# Patient Record
Sex: FEMALE | Race: WHITE | Employment: FULL TIME | URBAN - METROPOLITAN AREA
[De-identification: names, ages, dates, MRNs, and addresses within clinical notes are randomized per-mention and may not be internally consistent; named-entity substitution may affect disease eponyms.]

---

## 2018-08-31 ENCOUNTER — HOSPITAL ENCOUNTER (OUTPATIENT)
Dept: PREADMISSION TESTING | Age: 33
Discharge: HOME | End: 2018-08-31
Payer: COMMERCIAL

## 2018-08-31 LAB
ANION GAP SERPL CALC-SCNC: 6 MMOL/L (ref 3–18)
ATRIAL RATE: 72 BPM
BASOPHILS # BLD: 0 K/UL (ref 0–0.1)
BASOPHILS NFR BLD: 0 % (ref 0–2)
BUN SERPL-MCNC: 17 MG/DL (ref 7–18)
BUN/CREAT SERPL: 19 (ref 12–20)
CALCIUM SERPL-MCNC: 9.1 MG/DL (ref 8.5–10.1)
CALCULATED P AXIS, ECG09: 43 DEGREES
CALCULATED R AXIS, ECG10: 61 DEGREES
CALCULATED T AXIS, ECG11: 56 DEGREES
CHLORIDE SERPL-SCNC: 104 MMOL/L (ref 100–108)
CO2 SERPL-SCNC: 31 MMOL/L (ref 21–32)
CREAT SERPL-MCNC: 0.88 MG/DL (ref 0.6–1.3)
DIAGNOSIS, 93000: NORMAL
DIFFERENTIAL METHOD BLD: ABNORMAL
EOSINOPHIL # BLD: 0.5 K/UL (ref 0–0.4)
EOSINOPHIL NFR BLD: 4 % (ref 0–5)
ERYTHROCYTE [DISTWIDTH] IN BLOOD BY AUTOMATED COUNT: 14 % (ref 11.6–14.5)
GLUCOSE SERPL-MCNC: 87 MG/DL (ref 74–99)
HCG SERPL QL: NEGATIVE
HCT VFR BLD AUTO: 39.2 % (ref 35–45)
HGB BLD-MCNC: 12.6 G/DL (ref 12–16)
LYMPHOCYTES # BLD: 2.5 K/UL (ref 0.9–3.6)
LYMPHOCYTES NFR BLD: 22 % (ref 21–52)
MCH RBC QN AUTO: 24.8 PG (ref 24–34)
MCHC RBC AUTO-ENTMCNC: 32.1 G/DL (ref 31–37)
MCV RBC AUTO: 77.2 FL (ref 74–97)
MONOCYTES # BLD: 0.6 K/UL (ref 0.05–1.2)
MONOCYTES NFR BLD: 5 % (ref 3–10)
NEUTS SEG # BLD: 7.5 K/UL (ref 1.8–8)
NEUTS SEG NFR BLD: 69 % (ref 40–73)
P-R INTERVAL, ECG05: 134 MS
PLATELET # BLD AUTO: 390 K/UL (ref 135–420)
PMV BLD AUTO: 9.8 FL (ref 9.2–11.8)
POTASSIUM SERPL-SCNC: 4.1 MMOL/L (ref 3.5–5.5)
Q-T INTERVAL, ECG07: 398 MS
QRS DURATION, ECG06: 92 MS
QTC CALCULATION (BEZET), ECG08: 435 MS
RBC # BLD AUTO: 5.08 M/UL (ref 4.2–5.3)
SODIUM SERPL-SCNC: 141 MMOL/L (ref 136–145)
VENTRICULAR RATE, ECG03: 72 BPM
WBC # BLD AUTO: 11 K/UL (ref 4.6–13.2)

## 2018-08-31 PROCEDURE — 93005 ELECTROCARDIOGRAM TRACING: CPT

## 2018-08-31 PROCEDURE — 36415 COLL VENOUS BLD VENIPUNCTURE: CPT | Performed by: OBSTETRICS & GYNECOLOGY

## 2018-08-31 PROCEDURE — 84703 CHORIONIC GONADOTROPIN ASSAY: CPT | Performed by: OBSTETRICS & GYNECOLOGY

## 2018-08-31 PROCEDURE — 85025 COMPLETE CBC W/AUTO DIFF WBC: CPT | Performed by: OBSTETRICS & GYNECOLOGY

## 2018-08-31 PROCEDURE — 80048 BASIC METABOLIC PNL TOTAL CA: CPT | Performed by: OBSTETRICS & GYNECOLOGY

## 2018-09-07 ENCOUNTER — ANESTHESIA EVENT (OUTPATIENT)
Dept: SURGERY | Age: 33
End: 2018-09-07
Payer: COMMERCIAL

## 2018-09-07 NOTE — H&P
[address]  [address]  Tel: [phone]  Fax: [phone]      PATIENT:  Umm Hernandez  YOB: 1985  DATE:   08/29/2018 9:45 AM   VISIT TYPE: Pre-Operative Visit        Assessment/Plan  # Detail Type Description    1. Assessment Cyst of ovary, unspecified laterality (N83.209). Patient Plan 8 cm cyst favoring Left ovary; however, laterality is unclear on sono. Endometrial bx also reveals endometrial polyp. pt scheduled for hysteroscopy, d&c, polypectomy and laparoscopic ovarian cystectomy, possible oophrectomy. all questions answered and consent signed. 2. Assessment Abnormal uterine bleeding (N93.9). Patient Plan  See #1         3. Assessment Endometrial polyp (N84.0). Patient Plan  See #1          This 28year old female presents for Pre op visit;.    History of Present Illness:  1. Pre op visit; The symptoms began on 08/29/2018 and generally lasts 1 Hour. The symptoms are reported as being mild. The symptoms occur constantly. The location is uterine. The symptoms are described as painless. Aggravating factors include ovarian cyst, endometrial polyp, endometritis. Relieving factors include none. She states the symptoms are acute and are of new onset. Patient is schedule for [address] D&C, laparoscopic cystectomy, possible [facility]oph[facility] on 9/10 at [facility] due to left ovarian cyst, chronic endometritis and endometrial polyp. she still has random pelvic pain and urine leakage. The procedure was discussed in detail including but not limited to the risk of heavy bleeding, infection, perforation, DVT, scar tissue formation, the need for additional procedures, injury to adjacent organs, and the risk of anesthesia. All questions were answered and consent was signed. Gynecologic History:  Patient is premenopausal.     OBSTETRIC HISTORY    Not currently pregnant.    Past Systemic History    Medical History (Detailed)    Surgical History/Management (Detailed)  Diagnostics History:  Status Study Ordered Completed Interpretation  Result / Report   completed Foot 3 Views 2015      result received US, PELVIC, COMPLETE 2018  See module         Pap Result, HPV Detail and Diagnostic/Treatment Performed  Date Test Procedure Pap Result HPV Detail Comments   2018  See module     2014 LAB        PROBLEM LIST:   Problem List reviewed. Problem Description Onset Date Chronic Clinical Status Notes   Benign essential hypertension 2011 Y     Hyperlipidemia 2012 Y           Medications (active prior to today)  Medication Instructions Start Date Stop Date Refilled Elsewhere   lorazepam 0.5 mg tablet take 1 Tablet by Oral route 8 times every hour PRN 2017   N   Cytotec 200 mcg tablet place 1 tablet in the vagina at 8 pm the night prior to the procedure 2018   N   CHLORTHALIDONE 50MG TABLETS TAKE 1 TABLET BY MOUTH EVERY DAY 2018 N       Allergies:  Ingredient Reaction (Severity) Medication Name Comment   AMOXICILLIN Hives     AMPICILLIN Hives     PENICILLINS Rash         Family History  (Detailed)  Relationship Family Member Name  Age at Death Condition Onset Age Cause of Death   Father    Cancer, lung  Y   Father    Hypertension  N   Father    COPD  N   Maternal grandfather    Multiple myeloma  Y   Maternal grandfather    Cancer - blood  N   Mother    Hyperlipidemia  N   Mother    Diabetes mellitus  N   Mother    Hypertension  N   Paternal grandmother    Stroke  N   Paternal grandmother    Cardiovascular disease  N     Social History:  (Detailed)  Preferred language is [place]. MARITAL STATUS/FAMILY/SOCIAL SUPPORT  Currently single. Smoking status: Never smoker. TOBACCO/VAPING EXPOSURE  No passive smoke exposure. ALCOHOL  There is no history of alcohol use. CAFFEINE  The patient does not use caffeine.           Review of Systems  System Neg/Pos Details   Constitutional Negative Chills, Fatigue, Fever and Weight loss. Respiratory Negative Dyspnea. Cardio Negative Chest pain. GI Negative Abdominal pain and Change in stool pattern.  Negative Dysuria, Hematuria and Urinary frequency. Integumentary Negative Pruritus and Rash. MS Negative Back pain. Reproductive Positive The patient is pre-menopausal.   Reproductive Negative Irregular menses and Vaginal discharge. Vital Signs     Gynecologic History  Patient is premenopausal.      Height  Time ft in cm Last Measured Height Position   10:02 AM 5.0 11.00 180.34 07/02/2014 Standing     Weight/BSA/BMI  Time lb oz kg Context BMI kg/m2 BSA m2   10:02 .00  109.316  33.61 2.34     Blood Pressure  Time BP mm/Hg Position Side Site Method Cuff Size   10:02 /68 sitting left arm manual      Measured By  Time Measured by   10:02 AM Sydney Lawson       Physical Exam  Exam Findings Details   Constitutional * Nourishment - obese. Constitutional Normal No acute distress. Well developed. Respiratory Normal Inspection - Normal. Effort - Normal.   Abdomen * Obese. Abdomen Normal Anterior palpation -  No Guarding,No rebound. No abdominal tenderness. Genitourinary * Pelvic deferred. Pap not performed   Psychiatric Normal Orientation - Oriented to time, place, person & situation. Appropriate mood and affect. Medications (Added, Continued or Stopped today):  Start Date Medication Directions PRN Status PRN Reason Instruction Stop Date   08/23/2018 CHLORTHALIDONE 50MG TABLETS TAKE 1 TABLET BY MOUTH EVERY DAY N      06/19/2018 Cytotec 200 mcg tablet place 1 tablet in the vagina at 8 pm the night prior to the procedure N      03/09/2017 lorazepam 0.5 mg tablet take 1 Tablet by Oral route 8 times every hour PRN N            The patient was checked out at 10:39 AM by Deion Shane.   Active Patient Care Team Members    Name Contact Agency Type Support Role Relationship Active Date Inactive Date Specialty   Zuleima Parra   Patient provider PCP   Family Practice     Provider:   No interval changes in the patient's history  Isrrael Rosenberg 09/06/2018 9:36 AM   Document generated by:  Sakina Tran 09/06/2018 09:35 AM

## 2018-09-10 ENCOUNTER — HOSPITAL ENCOUNTER (OUTPATIENT)
Age: 33
Setting detail: OUTPATIENT SURGERY
Discharge: HOME | End: 2018-09-10
Attending: OBSTETRICS & GYNECOLOGY | Admitting: OBSTETRICS & GYNECOLOGY
Payer: COMMERCIAL

## 2018-09-10 ENCOUNTER — ANESTHESIA (OUTPATIENT)
Dept: SURGERY | Age: 33
End: 2018-09-10
Payer: COMMERCIAL

## 2018-09-10 VITALS
SYSTOLIC BLOOD PRESSURE: 101 MMHG | DIASTOLIC BLOOD PRESSURE: 54 MMHG | RESPIRATION RATE: 16 BRPM | OXYGEN SATURATION: 100 % | HEART RATE: 67 BPM | BODY MASS INDEX: 32.03 KG/M2 | TEMPERATURE: 97 F | WEIGHT: 236.5 LBS | HEIGHT: 72 IN

## 2018-09-10 DIAGNOSIS — N83.202 CYST OF LEFT OVARY: Primary | ICD-10-CM

## 2018-09-10 PROBLEM — N83.209 OVARIAN CYST: Status: RESOLVED | Noted: 2018-09-10 | Resolved: 2018-09-10

## 2018-09-10 PROBLEM — N84.0 ENDOMETRIAL POLYP: Status: RESOLVED | Noted: 2018-09-10 | Resolved: 2018-09-10

## 2018-09-10 PROBLEM — N83.209 OVARIAN CYST: Status: ACTIVE | Noted: 2018-09-10

## 2018-09-10 PROBLEM — N84.0 ENDOMETRIAL POLYP: Status: ACTIVE | Noted: 2018-09-10

## 2018-09-10 LAB — HCG UR QL: NEGATIVE

## 2018-09-10 PROCEDURE — 77030032490 HC SLV COMPR SCD KNE COVD -B: Performed by: OBSTETRICS & GYNECOLOGY

## 2018-09-10 PROCEDURE — 77030020782 HC GWN BAIR PAWS FLX 3M -B: Performed by: OBSTETRICS & GYNECOLOGY

## 2018-09-10 PROCEDURE — 74011250636 HC RX REV CODE- 250/636: Performed by: OBSTETRICS & GYNECOLOGY

## 2018-09-10 PROCEDURE — 77030008603 HC TRCR ENDOSC EPATH J&J -C: Performed by: OBSTETRICS & GYNECOLOGY

## 2018-09-10 PROCEDURE — 77030008683 HC TU ET CUF COVD -A: Performed by: NURSE ANESTHETIST, CERTIFIED REGISTERED

## 2018-09-10 PROCEDURE — 88305 TISSUE EXAM BY PATHOLOGIST: CPT | Performed by: OBSTETRICS & GYNECOLOGY

## 2018-09-10 PROCEDURE — 81025 URINE PREGNANCY TEST: CPT

## 2018-09-10 PROCEDURE — 74011000250 HC RX REV CODE- 250

## 2018-09-10 PROCEDURE — 77030008477 HC STYL SATN SLP COVD -A: Performed by: NURSE ANESTHETIST, CERTIFIED REGISTERED

## 2018-09-10 PROCEDURE — 74011250636 HC RX REV CODE- 250/636: Performed by: SPECIALIST

## 2018-09-10 PROCEDURE — 76210000026 HC REC RM PH II 1 TO 1.5 HR: Performed by: OBSTETRICS & GYNECOLOGY

## 2018-09-10 PROCEDURE — 76010000131 HC OR TIME 2 TO 2.5 HR: Performed by: OBSTETRICS & GYNECOLOGY

## 2018-09-10 PROCEDURE — 77030019927 HC TBNG IRR CYSTO BAXT -A: Performed by: OBSTETRICS & GYNECOLOGY

## 2018-09-10 PROCEDURE — 77030020255 HC SOL INJ LR 1000ML BG: Performed by: OBSTETRICS & GYNECOLOGY

## 2018-09-10 PROCEDURE — 77030008602 HC TRCR ENDOSC EPATH J&J -B: Performed by: OBSTETRICS & GYNECOLOGY

## 2018-09-10 PROCEDURE — 77030002933 HC SUT MCRYL J&J -A: Performed by: OBSTETRICS & GYNECOLOGY

## 2018-09-10 PROCEDURE — 74011000250 HC RX REV CODE- 250: Performed by: OBSTETRICS & GYNECOLOGY

## 2018-09-10 PROCEDURE — 77030039266 HC ADH SKN EXOFIN S2SG -A: Performed by: OBSTETRICS & GYNECOLOGY

## 2018-09-10 PROCEDURE — 74011250636 HC RX REV CODE- 250/636

## 2018-09-10 PROCEDURE — 77030013079 HC BLNKT BAIR HGGR 3M -A: Performed by: NURSE ANESTHETIST, CERTIFIED REGISTERED

## 2018-09-10 PROCEDURE — 76210000006 HC OR PH I REC 0.5 TO 1 HR: Performed by: OBSTETRICS & GYNECOLOGY

## 2018-09-10 PROCEDURE — 77030034849: Performed by: OBSTETRICS & GYNECOLOGY

## 2018-09-10 PROCEDURE — 76060000035 HC ANESTHESIA 2 TO 2.5 HR: Performed by: OBSTETRICS & GYNECOLOGY

## 2018-09-10 PROCEDURE — 74011250637 HC RX REV CODE- 250/637: Performed by: OBSTETRICS & GYNECOLOGY

## 2018-09-10 RX ORDER — MORPHINE SULFATE 4 MG/ML
1 INJECTION INTRAVENOUS
Status: DISCONTINUED | OUTPATIENT
Start: 2018-09-10 | End: 2018-09-10 | Stop reason: HOSPADM

## 2018-09-10 RX ORDER — GLYCOPYRROLATE 0.2 MG/ML
INJECTION INTRAMUSCULAR; INTRAVENOUS AS NEEDED
Status: DISCONTINUED | OUTPATIENT
Start: 2018-09-10 | End: 2018-09-10 | Stop reason: HOSPADM

## 2018-09-10 RX ORDER — SODIUM CHLORIDE 9 MG/ML
125 INJECTION, SOLUTION INTRAVENOUS CONTINUOUS
Status: DISCONTINUED | OUTPATIENT
Start: 2018-09-10 | End: 2018-09-10 | Stop reason: HOSPADM

## 2018-09-10 RX ORDER — HYDROMORPHONE HYDROCHLORIDE 2 MG/ML
0.2 INJECTION, SOLUTION INTRAMUSCULAR; INTRAVENOUS; SUBCUTANEOUS
Status: DISCONTINUED | OUTPATIENT
Start: 2018-09-10 | End: 2018-09-10 | Stop reason: HOSPADM

## 2018-09-10 RX ORDER — MIDAZOLAM HYDROCHLORIDE 1 MG/ML
INJECTION, SOLUTION INTRAMUSCULAR; INTRAVENOUS AS NEEDED
Status: DISCONTINUED | OUTPATIENT
Start: 2018-09-10 | End: 2018-09-10 | Stop reason: HOSPADM

## 2018-09-10 RX ORDER — METOCLOPRAMIDE HYDROCHLORIDE 5 MG/ML
INJECTION INTRAMUSCULAR; INTRAVENOUS AS NEEDED
Status: DISCONTINUED | OUTPATIENT
Start: 2018-09-10 | End: 2018-09-10 | Stop reason: HOSPADM

## 2018-09-10 RX ORDER — SODIUM CHLORIDE, SODIUM LACTATE, POTASSIUM CHLORIDE, CALCIUM CHLORIDE 600; 310; 30; 20 MG/100ML; MG/100ML; MG/100ML; MG/100ML
125 INJECTION, SOLUTION INTRAVENOUS CONTINUOUS
Status: DISCONTINUED | OUTPATIENT
Start: 2018-09-10 | End: 2018-09-10 | Stop reason: HOSPADM

## 2018-09-10 RX ORDER — DEXAMETHASONE SODIUM PHOSPHATE 4 MG/ML
INJECTION, SOLUTION INTRA-ARTICULAR; INTRALESIONAL; INTRAMUSCULAR; INTRAVENOUS; SOFT TISSUE AS NEEDED
Status: DISCONTINUED | OUTPATIENT
Start: 2018-09-10 | End: 2018-09-10 | Stop reason: HOSPADM

## 2018-09-10 RX ORDER — IBUPROFEN 600 MG/1
600 TABLET ORAL
Qty: 30 TAB | Refills: 0 | Status: SHIPPED | OUTPATIENT
Start: 2018-09-10

## 2018-09-10 RX ORDER — ONDANSETRON 2 MG/ML
4 INJECTION INTRAMUSCULAR; INTRAVENOUS ONCE
Status: DISCONTINUED | OUTPATIENT
Start: 2018-09-10 | End: 2018-09-10 | Stop reason: HOSPADM

## 2018-09-10 RX ORDER — ONDANSETRON 2 MG/ML
INJECTION INTRAMUSCULAR; INTRAVENOUS AS NEEDED
Status: DISCONTINUED | OUTPATIENT
Start: 2018-09-10 | End: 2018-09-10 | Stop reason: HOSPADM

## 2018-09-10 RX ORDER — SODIUM CHLORIDE 0.9 % (FLUSH) 0.9 %
5-10 SYRINGE (ML) INJECTION AS NEEDED
Status: DISCONTINUED | OUTPATIENT
Start: 2018-09-10 | End: 2018-09-10 | Stop reason: HOSPADM

## 2018-09-10 RX ORDER — DEXTROSE 50 % IN WATER (D50W) INTRAVENOUS SYRINGE
25-50 AS NEEDED
Status: DISCONTINUED | OUTPATIENT
Start: 2018-09-10 | End: 2018-09-10 | Stop reason: HOSPADM

## 2018-09-10 RX ORDER — LIDOCAINE HYDROCHLORIDE 20 MG/ML
INJECTION, SOLUTION EPIDURAL; INFILTRATION; INTRACAUDAL; PERINEURAL AS NEEDED
Status: DISCONTINUED | OUTPATIENT
Start: 2018-09-10 | End: 2018-09-10 | Stop reason: HOSPADM

## 2018-09-10 RX ORDER — OXYCODONE AND ACETAMINOPHEN 5; 325 MG/1; MG/1
2 TABLET ORAL
Status: DISCONTINUED | OUTPATIENT
Start: 2018-09-10 | End: 2018-09-10 | Stop reason: HOSPADM

## 2018-09-10 RX ORDER — NEOSTIGMINE METHYLSULFATE 5 MG/5 ML
SYRINGE (ML) INTRAVENOUS AS NEEDED
Status: DISCONTINUED | OUTPATIENT
Start: 2018-09-10 | End: 2018-09-10 | Stop reason: HOSPADM

## 2018-09-10 RX ORDER — OXYCODONE AND ACETAMINOPHEN 5; 325 MG/1; MG/1
1 TABLET ORAL
Status: DISCONTINUED | OUTPATIENT
Start: 2018-09-10 | End: 2018-09-10 | Stop reason: HOSPADM

## 2018-09-10 RX ORDER — MORPHINE SULFATE 4 MG/ML
2 INJECTION INTRAVENOUS
Status: DISCONTINUED | OUTPATIENT
Start: 2018-09-10 | End: 2018-09-10 | Stop reason: HOSPADM

## 2018-09-10 RX ORDER — PROPOFOL 10 MG/ML
INJECTION, EMULSION INTRAVENOUS AS NEEDED
Status: DISCONTINUED | OUTPATIENT
Start: 2018-09-10 | End: 2018-09-10 | Stop reason: HOSPADM

## 2018-09-10 RX ORDER — BUPIVACAINE HYDROCHLORIDE 2.5 MG/ML
INJECTION, SOLUTION EPIDURAL; INFILTRATION; INTRACAUDAL AS NEEDED
Status: DISCONTINUED | OUTPATIENT
Start: 2018-09-10 | End: 2018-09-10 | Stop reason: HOSPADM

## 2018-09-10 RX ORDER — KETOROLAC TROMETHAMINE 30 MG/ML
INJECTION, SOLUTION INTRAMUSCULAR; INTRAVENOUS AS NEEDED
Status: DISCONTINUED | OUTPATIENT
Start: 2018-09-10 | End: 2018-09-10 | Stop reason: HOSPADM

## 2018-09-10 RX ORDER — HYDROMORPHONE HYDROCHLORIDE 2 MG/ML
0.5 INJECTION, SOLUTION INTRAMUSCULAR; INTRAVENOUS; SUBCUTANEOUS
Status: DISCONTINUED | OUTPATIENT
Start: 2018-09-10 | End: 2018-09-10 | Stop reason: HOSPADM

## 2018-09-10 RX ORDER — NALOXONE HYDROCHLORIDE 0.4 MG/ML
0.1 INJECTION, SOLUTION INTRAMUSCULAR; INTRAVENOUS; SUBCUTANEOUS AS NEEDED
Status: DISCONTINUED | OUTPATIENT
Start: 2018-09-10 | End: 2018-09-10 | Stop reason: HOSPADM

## 2018-09-10 RX ORDER — FENTANYL CITRATE 50 UG/ML
INJECTION, SOLUTION INTRAMUSCULAR; INTRAVENOUS AS NEEDED
Status: DISCONTINUED | OUTPATIENT
Start: 2018-09-10 | End: 2018-09-10 | Stop reason: HOSPADM

## 2018-09-10 RX ORDER — ROCURONIUM BROMIDE 10 MG/ML
INJECTION, SOLUTION INTRAVENOUS AS NEEDED
Status: DISCONTINUED | OUTPATIENT
Start: 2018-09-10 | End: 2018-09-10 | Stop reason: HOSPADM

## 2018-09-10 RX ORDER — ONDANSETRON 2 MG/ML
4 INJECTION INTRAMUSCULAR; INTRAVENOUS
Status: DISCONTINUED | OUTPATIENT
Start: 2018-09-10 | End: 2018-09-10 | Stop reason: HOSPADM

## 2018-09-10 RX ORDER — FENTANYL CITRATE 50 UG/ML
50 INJECTION, SOLUTION INTRAMUSCULAR; INTRAVENOUS
Status: DISCONTINUED | OUTPATIENT
Start: 2018-09-10 | End: 2018-09-10 | Stop reason: HOSPADM

## 2018-09-10 RX ORDER — OXYCODONE AND ACETAMINOPHEN 5; 325 MG/1; MG/1
TABLET ORAL
Qty: 30 TAB | Refills: 0 | Status: SHIPPED | OUTPATIENT
Start: 2018-09-10

## 2018-09-10 RX ORDER — FENTANYL CITRATE 50 UG/ML
25 INJECTION, SOLUTION INTRAMUSCULAR; INTRAVENOUS AS NEEDED
Status: DISCONTINUED | OUTPATIENT
Start: 2018-09-10 | End: 2018-09-10 | Stop reason: HOSPADM

## 2018-09-10 RX ORDER — SODIUM CHLORIDE 0.9 % (FLUSH) 0.9 %
5-10 SYRINGE (ML) INJECTION EVERY 8 HOURS
Status: DISCONTINUED | OUTPATIENT
Start: 2018-09-10 | End: 2018-09-10 | Stop reason: HOSPADM

## 2018-09-10 RX ORDER — MAGNESIUM SULFATE 100 %
4 CRYSTALS MISCELLANEOUS AS NEEDED
Status: DISCONTINUED | OUTPATIENT
Start: 2018-09-10 | End: 2018-09-10 | Stop reason: HOSPADM

## 2018-09-10 RX ADMIN — KETOROLAC TROMETHAMINE 30 MG: 30 INJECTION, SOLUTION INTRAMUSCULAR; INTRAVENOUS at 12:05

## 2018-09-10 RX ADMIN — SODIUM CHLORIDE, SODIUM LACTATE, POTASSIUM CHLORIDE, AND CALCIUM CHLORIDE: 600; 310; 30; 20 INJECTION, SOLUTION INTRAVENOUS at 10:54

## 2018-09-10 RX ADMIN — SODIUM CHLORIDE, SODIUM LACTATE, POTASSIUM CHLORIDE, AND CALCIUM CHLORIDE 125 ML/HR: 600; 310; 30; 20 INJECTION, SOLUTION INTRAVENOUS at 09:35

## 2018-09-10 RX ADMIN — FENTANYL CITRATE 50 MCG: 50 INJECTION, SOLUTION INTRAMUSCULAR; INTRAVENOUS at 12:45

## 2018-09-10 RX ADMIN — PROPOFOL 200 MG: 10 INJECTION, EMULSION INTRAVENOUS at 10:24

## 2018-09-10 RX ADMIN — LIDOCAINE HYDROCHLORIDE 40 MG: 20 INJECTION, SOLUTION EPIDURAL; INFILTRATION; INTRACAUDAL; PERINEURAL at 10:24

## 2018-09-10 RX ADMIN — DEXAMETHASONE SODIUM PHOSPHATE 4 MG: 4 INJECTION, SOLUTION INTRA-ARTICULAR; INTRALESIONAL; INTRAMUSCULAR; INTRAVENOUS; SOFT TISSUE at 10:17

## 2018-09-10 RX ADMIN — ONDANSETRON 4 MG: 2 INJECTION INTRAMUSCULAR; INTRAVENOUS at 10:17

## 2018-09-10 RX ADMIN — FENTANYL CITRATE 100 MCG: 50 INJECTION, SOLUTION INTRAMUSCULAR; INTRAVENOUS at 11:14

## 2018-09-10 RX ADMIN — OXYCODONE HYDROCHLORIDE AND ACETAMINOPHEN 1 TABLET: 5; 325 TABLET ORAL at 13:25

## 2018-09-10 RX ADMIN — MIDAZOLAM HYDROCHLORIDE 2 MG: 1 INJECTION, SOLUTION INTRAMUSCULAR; INTRAVENOUS at 10:17

## 2018-09-10 RX ADMIN — FENTANYL CITRATE 50 MCG: 50 INJECTION, SOLUTION INTRAMUSCULAR; INTRAVENOUS at 12:36

## 2018-09-10 RX ADMIN — Medication 3 MG: at 12:17

## 2018-09-10 RX ADMIN — FENTANYL CITRATE 100 MCG: 50 INJECTION, SOLUTION INTRAMUSCULAR; INTRAVENOUS at 10:17

## 2018-09-10 RX ADMIN — GLYCOPYRROLATE 0.3 MG: 0.2 INJECTION INTRAMUSCULAR; INTRAVENOUS at 12:16

## 2018-09-10 RX ADMIN — ROCURONIUM BROMIDE 30 MG: 10 INJECTION, SOLUTION INTRAVENOUS at 11:14

## 2018-09-10 RX ADMIN — SODIUM CHLORIDE, SODIUM LACTATE, POTASSIUM CHLORIDE, AND CALCIUM CHLORIDE 125 ML/HR: 600; 310; 30; 20 INJECTION, SOLUTION INTRAVENOUS at 13:37

## 2018-09-10 RX ADMIN — ROCURONIUM BROMIDE 50 MG: 10 INJECTION, SOLUTION INTRAVENOUS at 10:24

## 2018-09-10 RX ADMIN — GLYCOPYRROLATE 0.2 MG: 0.2 INJECTION INTRAMUSCULAR; INTRAVENOUS at 10:17

## 2018-09-10 RX ADMIN — METOCLOPRAMIDE HYDROCHLORIDE 10 MG: 5 INJECTION INTRAMUSCULAR; INTRAVENOUS at 10:17

## 2018-09-10 NOTE — PERIOP NOTES
Reviewed PTA medication list with patient/caregiver and patient/caregiver denies any additional medications. Patient admits to having a responsible adult care for them for at least 24 hours after surgery. 
  
Dual skin assessment completed by Viviana ARMAS and Melvin Chávez RN.

## 2018-09-10 NOTE — H&P (VIEW-ONLY)
[facility] [address]  [address] Tel: [phone] Fax: [phone] PATIENT:  Mai Llanos YOB: 1985 DATE:   08/29/2018 9:45 AM  
VISIT TYPE: Pre-Operative Visit Assessment/Plan # Detail Type Description 1. Assessment Cyst of ovary, unspecified laterality (N83.209). Patient Plan 8 cm cyst favoring Left ovary; however, laterality is unclear on sono. Endometrial bx also reveals endometrial polyp. pt scheduled for hysteroscopy, d&c, polypectomy and laparoscopic ovarian cystectomy, possible oophrectomy. all questions answered and consent signed. 2. Assessment Abnormal uterine bleeding (N93.9). Patient Plan  See #1 3. Assessment Endometrial polyp (N84.0). Patient Plan  See #1 This 28year old female presents for Pre op visit;. 
 
History of Present Illness: 1. Pre op visit; The symptoms began on 08/29/2018 and generally lasts 1 Hour. The symptoms are reported as being mild. The symptoms occur constantly. The location is uterine. The symptoms are described as painless. Aggravating factors include ovarian cyst, endometrial polyp, endometritis. Relieving factors include none. She states the symptoms are acute and are of new onset. Patient is schedule for [facility] D[facility], laparoscopic cystectomy, possible oophorectomy on 9/10 at [facility] due to left ovarian cyst, chronic endometritis and endometrial polyp. she still has random pelvic pain and urine leakage. The procedure was discussed in detail including but not limited to the risk of heavy bleeding, infection, perforation, DVT, scar tissue formation, the need for additional procedures, injury to adjacent organs, and the risk of anesthesia. All questions were answered and consent was signed. Gynecologic History: 
Patient is premenopausal.  
 
OBSTETRIC HISTORY Not currently pregnant. Past Systemic History Medical History (Detailed) Surgical History/Management (Detailed) Diagnostics History: 
Status Study Ordered Completed Interpretation  Result / Report  
completed Foot 3 Views 2015     
result received US, PELVIC, COMPLETE 2018  See module Pap Result, HPV Detail and Diagnostic/Treatment Performed Date Test Procedure Pap Result HPV Detail Comments 2018  See module 2014 LAB PROBLEM LIST:   Problem List reviewed. Problem Description Onset Date Chronic Clinical Status Notes Benign essential hypertension 2011 Y Hyperlipidemia 2012 Y Medications (active prior to today) Medication Instructions Start Date Stop Date Refilled Elsewhere  
lorazepam 0.5 mg tablet take 1 Tablet by Oral route 8 times every hour PRN 2017   N Cytotec 200 mcg tablet place 1 tablet in the vagina at 8 pm the night prior to the procedure 2018   N  
CHLORTHALIDONE 50MG TABLETS TAKE 1 TABLET BY MOUTH EVERY DAY 2018 N Allergies: 
Ingredient Reaction (Severity) Medication Name Comment AMOXICILLIN Hives AMPICILLIN Hives PENICILLINS Rash Family History  (Detailed) Relationship Family Member Name  Age at Death Condition Onset Age Cause of Death Father    Cancer, lung  Y Father    Hypertension  N Father    COPD  N Maternal grandfather    Multiple myeloma  Y Maternal grandfather    Cancer - blood  N Mother    Hyperlipidemia  N Mother    Diabetes mellitus  N Mother    Hypertension  N  
Paternal grandmother    Stroke  N Paternal grandmother    Cardiovascular disease  N Social History:  (Detailed) Preferred language is [place]. MARITAL STATUS/FAMILY/SOCIAL SUPPORT Currently single. Smoking status: Never smoker. TOBACCO/VAPING EXPOSURE No passive smoke exposure. ALCOHOL There is no history of alcohol use. CAFFEINE The patient does not use caffeine. Review of Systems System Neg/Pos Details Constitutional Negative Chills, Fatigue, Fever and Weight loss. Respiratory Negative Dyspnea. Cardio Negative Chest pain. GI Negative Abdominal pain and Change in stool pattern.  Negative Dysuria, Hematuria and Urinary frequency. Integumentary Negative Pruritus and Rash. MS Negative Back pain. Reproductive Positive The patient is pre-menopausal.  
Reproductive Negative Irregular menses and Vaginal discharge. Vital Signs Gynecologic History Patient is premenopausal.   
 
Height Time ft in cm Last Measured Height Position 10:02 AM 5.0 11.00 180.34 07/02/2014 Standing Weight/BSA/BMI Time lb oz kg Context BMI kg/m2 BSA m2  
10:02 .00  109.316  33.61 2.34 Blood Pressure Time BP mm/Hg Position Side Site Method Cuff Size 10:02 /68 sitting left arm manual   
 
Measured By Time Measured by  
10:02 AM Akhil Knox Physical Exam 
Exam Findings Details Constitutional * Nourishment - obese. Constitutional Normal No acute distress. Well developed. Respiratory Normal Inspection - Normal. Effort - Normal.  
Abdomen * Obese. Abdomen Normal Anterior palpation -  No Guarding,No rebound. No abdominal tenderness. Genitourinary * Pelvic deferred. Pap not performed Psychiatric Normal Orientation - Oriented to time, place, person & situation. Appropriate mood and affect. Medications (Added, Continued or Stopped today): 
Start Date Medication Directions PRN Status PRN Reason Instruction Stop Date  
08/23/2018 CHLORTHALIDONE 50MG TABLETS TAKE 1 TABLET BY MOUTH EVERY DAY N     
06/19/2018 Cytotec 200 mcg tablet place 1 tablet in the vagina at 8 pm the night prior to the procedure N     
03/09/2017 lorazepam 0.5 mg tablet take 1 Tablet by Oral route 8 times every hour PRN N The patient was checked out at 10:39 AM by Deana Herrera. Active Patient Care Team Members Name Contact Agency Type Support Role Relationship Active Date Inactive Date Specialty Johnson Giles   Patient provider PCP   [facility] Provider:   No interval changes in the patient's history James Nieto 09/06/2018 9:36 AM  
Document generated by:  Yesenia Bolaños 09/06/2018 09:35 AM

## 2018-09-10 NOTE — IP AVS SNAPSHOT
[address] [place] [address] 
[phone] Patient: Rylie Hoff MRN: [id number] FOU:3/3/[id number] About your hospitalization You were admitted on:  September 10, 2018 You last received care in the:  [facility] PHASE 2 RECOVERY You were discharged on:  September 10, 2018 Why you were hospitalized Your primary diagnosis was:  Not on File Your diagnoses also included:  Endometrial Polyp, Ovarian Cyst  
  
Follow-up Information Follow up With Details Comments Contact Info Leonardo Shrestha MD   [id number] [address] [facility] 
[phone] Discharge Orders None A check soren indicates which time of day the medication should be taken. My Medications START taking these medications Instructions Each Dose to Equal  
 Morning Noon Evening Bedtime  
 ibuprofen 600 mg tablet Commonly known as:  MOTRIN Your last dose was: Your next dose is: Take 1 Tab by mouth every six (6) hours as needed for Pain. 600 mg  
    
   
   
   
  
 oxyCODONE-acetaminophen 5-325 mg per tablet Commonly known as:  PERCOCET Your last dose was: Your next dose is:    
   
   
 1-2 tablets by mouth q 4 hours as needed CONTINUE taking these medications Instructions Each Dose to Equal  
 Morning Noon Evening Bedtime  
 chlorthalidone 50 mg tablet Commonly known as:  Goetz Cheyenneman Your last dose was: Your next dose is: Take 50 mg by mouth daily (with lunch). Indications: hypertension 50 mg Where to Get Your Medications Information on where to get these meds will be given to you by the nurse or doctor. ! Ask your nurse or doctor about these medications  
  ibuprofen 600 mg tablet  
 oxyCODONE-acetaminophen 5-325 mg per tablet Opioid Education Prescription Opioids: What You Need to Know: 
 
 
After general anesthesia or intravenous sedation, for 24 hours or while taking prescription Narcotics: · Limit your activities · Do not drive and operate hazardous machinery · Do not make important personal or business decisions · Do  not drink alcoholic beverages · If you have not urinated within 8 hours after discharge, please contact your surgeon on call. Report the following to your surgeon: 
· Excessive pain, swelling, redness or odor of or around the surgical area · Temperature over 100.5 · Nausea and vomiting lasting longer than 4 hours or if unable to take medications · Any signs of decreased circulation or nerve impairment to extremity: change in color, persistent  numbness, tingling, coldness or increase pain · Any questions What to do at Home: 
LIGHT DIET TODAY ADVANCE AS TOLERATED OK TO SHOWER 
PELVIC REST FOR 2 WEEKS 
NO HEAVY LIFTING OR STRENUOUS ACTIVITY FOR SEVERAL DAYS RETURN TO OFFICE IN 2 WEEKS CALL FOR APPT If you experience any of the following symptoms: heavy bleeding, fevers, severe pain, please follow up with dr Kel Goncalves *  Please give a list of your current medications to your Primary Care Provider. *  Please update this list whenever your medications are discontinued, doses are 
    changed, or new medications (including over-the-counter products) are added. *  Please carry medication information at all times in case of emergency situations. These are general instructions for a healthy lifestyle: No smoking/ No tobacco products/ Avoid exposure to second hand smoke Surgeon General's Warning:  Quitting smoking now greatly reduces serious risk to your health. Obesity, smoking, and sedentary lifestyle greatly increases your risk for illness A healthy diet, regular physical exercise & weight monitoring are important for maintaining a healthy lifestyle You may be retaining fluid if you have a history of heart failure or if you experience any of the following symptoms:  Weight gain of 3 pounds or more overnight or 5 pounds in a week, increased swelling in our hands or feet or shortness of breath while lying flat in bed. Please call your doctor as soon as you notice any of these symptoms; do not wait until your next office visit. Recognize signs and symptoms of STROKE: 
 
F-face looks uneven A-arms unable to move or move unevenly S-speech slurred or non-existent T-time-call 911 as soon as signs and symptoms begin-DO NOT go Back to bed or wait to see if you get better-TIME IS BRAIN. Warning Signs of HEART ATTACK Call 911 if you have these symptoms: 
? Chest discomfort. Most heart attacks involve discomfort in the center of the chest that lasts more than a few minutes, or that goes away and comes back. It can feel like uncomfortable pressure, squeezing, fullness, or pain. ? Discomfort in other areas of the upper body. Symptoms can include pain or discomfort in one or both arms, the back, neck, jaw, or stomach. ? Shortness of breath with or without chest discomfort. ? Other signs may include breaking out in a cold sweat, nausea, or lightheadedness. Don't wait more than five minutes to call [address]! Fast action can save your life. Calling 911 is almost always the fastest way to get lifesaving treatment. Emergency Medical Services staff can begin treatment when they arrive  up to an hour sooner than if someone gets to the hospital by car. The discharge information has been reviewed with the patient and caregiver. The patient and caregiver verbalized understanding. Discharge medications reviewed with the patient and caregiver and appropriate educational materials and side effects teaching were provided. ___________________________________________________________________________________________________________________________________ Laparoscopy: What to Expect at [facility] Your Recovery After laparoscopic surgery, you are likely to have pain for the next several days. You may also feel like you have the flu. You may have a low fever and feel tired and sick to your stomach. This is common. You should feel better after 1 to 2 weeks. This care sheet gives you a general idea about how long it will take for you to recover. But each person recovers at a different pace. Follow the steps below to get better as quickly as possible. How can you care for yourself at home? Activity 
  · Rest when you feel tired. Getting enough sleep will help you recover.  
  · Try to walk each day. Start by walking a little more than you did the day before. Bit by bit, increase the amount you walk. Walking boosts blood flow and helps prevent pneumonia and constipation.  
  · Avoid strenuous activities, such as bicycle riding, jogging, weight lifting, or aerobic exercise, until your doctor says it is okay.  
  · Avoid lifting anything that would make you strain. This may include a child, heavy grocery bags and milk containers, a heavy briefcase or backpack, cat litter or dog food bags, or a vacuum .  
  · You may also have pain in your shoulder. The pain usually lasts about 1 or 2 days.  
  · You may drive when you are no longer taking pain medicine and can quickly move your foot from the gas pedal to the brake. You must also be able to sit comfortably for a long period of time, even if you do not plan to go far. You might get caught in traffic.  
  · You will probably need to take 2 weeks off from work. It depends on the type of work you do and how you feel.   · You may shower 24 to 48 hours after surgery, if your doctor okays it. Pat the cut (incision) dry. Do not take a bath for the first 2 weeks, or until your doctor tells you it is okay. Diet 
  · If your stomach is upset, try bland, low-fat foods such as plain rice, broiled chicken, toast, and yogurt.  
  · Drink plenty of fluids (enough so that your urine is light yellow or clear like water) to prevent dehydration. Choose water and other caffeine-free clear liquids. If you have kidney, heart, or liver disease and have to limit fluids, talk with your doctor before you increase the amount of fluids you drink.  
  · You may notice that your bowel movements are not regular right after your surgery. This is common. Avoid constipation and straining with bowel movements. You may want to take a fiber supplement every day. If you have not had a bowel movement after a couple of days, ask your doctor about taking a mild laxative. Medicines 
  · Your doctor will tell you if and when you can restart your medicines. He or she will also give you instructions about taking any new medicines.  
  · If you take blood thinners, such as warfarin (Coumadin), clopidogrel (Plavix), or aspirin, be sure to talk to your doctor. He or she will tell you if and when to start taking those medicines again. Make sure that you understand exactly what your doctor wants you to do.  
  · Take pain medicines exactly as directed. ¨ If the doctor gave you a prescription medicine for pain, take it as prescribed. ¨ If you are not taking a prescription pain medicine, ask your doctor if you can take an over-the-counter medicine.  
  · If your doctor prescribed antibiotics, take them as directed. Do not stop taking them just because you feel better. You need to take the full course of antibiotics.  
  · If you think your pain medicine is making you sick to your stomach: 
¨ Take your medicine after meals (unless your doctor has told you not to). ¨ Ask your doctor for a different pain medicine. Incision care 
  · If you have strips of tape on the incision, leave the tape on for a week or until it falls off.  
  · Wash the area daily with warm, soapy water and pat it dry. Don't use hydrogen peroxide or alcohol, which can slow healing. You may cover the area with a gauze bandage if it weeps or rubs against clothing. Change the bandage every day. Follow-up care is a key part of your treatment and safety. Be sure to make and go to all appointments, and call your doctor if you are having problems. It's also a good idea to know your test results and keep a list of the medicines you take. When should you call for help? Call 911 anytime you think you may need emergency care. For example, call if: 
  · You passed out (lost consciousness).  
  · You are short of breath.  
 Call your doctor now or seek immediate medical care if: 
  · You have pain that does not get better after you take pain medicine.  
  · You have loose stitches, or your incision comes open.  
  · Bright red blood has soaked through your bandage.  
  · You have signs of infection, such as: 
¨ Increased pain, swelling, warmth, or redness. ¨ Red streaks leading from the incision. ¨ Pus draining from the incision. ¨ A fever.  
  · You are sick to your stomach or cannot keep fluids down.  
  · You have signs of a blood clot in your leg (called a deep vein thrombosis), such as: 
¨ Pain in your calf, back of the knee, thigh, or groin. ¨ Redness and swelling in your leg or groin.  
  · You cannot pass stools or gas.  
 Watch closely for any changes in your health, and be sure to contact your doctor if you have any problems. Where can you learn more? Go to http://giovanni-sera.info/. Enter V225 in the search box to learn more about \"Laparoscopy: What to Expect at Home. \" Current as of: May 12, 2017 Content Version: 11.7 © 5051-2838 Healthwise, Incorporated. Care instructions adapted under license by [employer] (which disclaims liability or warranty for this information). If you have questions about a medical condition or this instruction, always ask your healthcare professional. [address] any warranty or liability for your use of this information. Patient armband removed and shredded Introducing [facility]! [employer] introduces Unyqe patient portal. Now you can access parts of your medical record, email your doctor's office, and request medication refills online. 1. In your internet browser, go to [email or web address] Me [email or web address]/[email or web address] Me Stories 2. Click on the First Time User? Click Here link in the Sign In box. You will see the New Member Sign Up page. 3. Enter your Unyqe Access Code exactly as it appears below. You will not need to use this code after youve completed the sign-up process. If you do not sign up before the expiration date, you must request a new code. · Unyqe Access Code: [email or web address] Expires: 11/29/2018 11:24 AM 
 
4. Enter the last four digits of your Social Security Number (xxxx) and Date of Birth (mm/dd/yyyy) as indicated and click Submit. You will be taken to the next sign-up page. 5. Create a Unyqe ID. This will be your Unyqe login ID and cannot be changed, so think of one that is secure and easy to remember. 6. Create a Unyqe password. You can change your password at any time. 7. Enter your Password Reset Question and Answer. This can be used at a later time if you forget your password. 8. Enter your e-mail address. You will receive e-mail notification when new information is available in [address] 9. Click Sign Up. You can now view and download portions of your medical record. 10. Click the Download Summary menu link to download a portable copy of your medical information. If you have questions, please visit the Frequently Asked Questions section of the MyChart website. Remember, RedKLEVERt is NOT to be used for urgent needs. For medical emergencies, dial 911. Now available from your iPhone and Android! Introducing [facility] As a [facility]kes patient, I wanted to make you aware of our electronic visit tool called [facility]. [facility] 24/7 allows you to connect within minutes with a medical provider 24 hours a day, seven days a week via a mobile device or tablet or logging into a secure website from your computer. You can access [employer] [facility] from anywhere in the [place]. A virtual visit might be right for you when you have a simple condition and feel like you just dont want to get out of bed, or cant get away from work for an appointment, when your regular Sarah Wilkes provider is not available (evenings, weekends or holidays), or when youre out of town and need minor care. Electronic visits cost only $49 and if the [facility] Wilkes 24/7 provider determines a prescription is needed to treat your condition, one can be electronically transmitted to a nearby pharmacy*. Please take a moment to enroll today if you have not already done so. The enrollment process is free and takes just a few minutes. To enroll, please download the Sarah Wilkes 24/[employer] mayco to your tablet or phone, or visit [email or web address] to enroll on your computer. And, as an [address] [place] [address] patient with a [employer] account, the results of your visits will be scanned into your electronic medical record and your primary care provider will be able to view the scanned results. We urge you to continue to see your regular Sarah Wilkes provider for your ongoing medical care.   And while your primary care provider may not be the one available when you seek a [facility] virtual visit, the peace of mind you get from getting a real diagnosis real time can be priceless. For more information on [facility], view our Frequently Asked Questions (FAQs) at [email or web address]. Sincerely, 
 
Tete London MD 
Chief Medical Officer [address] *:  certain medications cannot be prescribed via [employer] Providers Seen During Your Hospitalization Provider Specialty Primary office phone Lindsey Padgett MD Obstetrics & Gynecology [phone] Your Primary Care Physician (PCP) Primary Care Physician Office Phone Office Fax [address] [place] [phone] You are allergic to the following Allergen Reactions Pcn (Penicillins) Hives Recent Documentation Height Weight BMI OB Status Smoking Status 1.829 m 107.3 kg 32.08 kg/m2 Having regular periods Never Smoker Emergency Contacts Name Discharge Info Relation Home Work Mobile Gordo Chapman DISCHARGE CAREGIVER [3] Spouse [3]   [phone] Patient Belongings The following personal items are in your possession at time of discharge: 
  Dental Appliances: None         Home Medications: None   Jewelry: None  Clothing: Footwear, Pants, Shirt, Socks, Undergarments (5)    Other Valuables: None Please provide this summary of care documentation to your next provider. Signatures-by signing, you are acknowledging that this After Visit Summary has been reviewed with you and you have received a copy. Patient Signature:  ____________________________________________________________ Date:  ____________________________________________________________  
  
Althia Kras Provider Signature:  ____________________________________________________________ Date:  ____________________________________________________________

## 2018-09-10 NOTE — ANESTHESIA POSTPROCEDURE EVALUATION
Post-Anesthesia Evaluation and Assessment Cardiovascular Function/Vital Signs Visit Vitals  /64  Pulse 73  Temp 36.6 °C (97.8 °F)  Resp 15  Ht 6' (1.829 m)  Wt 107.3 kg (236 lb 8 oz)  SpO2 100%  BMI 32.08 kg/m2 Patient is status post Procedure(s): LAPAROSCOPIC LEFT OVARIAN CYSTECTOMY 
DILATATION AND CURETTAGE HYSTEROSCOPY . Nausea/Vomiting: Controlled. Postoperative hydration reviewed and adequate. Pain: 
Pain Scale 1: FLACC (09/10/18 1249) Pain Intensity 1: 0 (09/10/18 1249) Managed. Neurological Status:  
Neuro (WDL): Within Defined Limits (09/10/18 1245) At baseline. Mental Status and Level of Consciousness: Arousable. Pulmonary Status:  
O2 Device: Room air (09/10/18 1244) Adequate oxygenation and airway patent. Complications related to anesthesia: None Post-anesthesia assessment completed. No concerns. Patient has met all discharge requirements. Signed By: Campos Montoya CRNA September 10, 2018

## 2018-09-10 NOTE — OP NOTES
[address] REPORT    Noemi Newell  MR#: [id number]  : 1985  ACCOUNT #: [de-identified]   DATE OF SERVICE: 09/10/2018    PREOPERATIVE DIAGNOSES:  1.  Endometrial polyp. 2.  Left ovarian cyst.  3.  Dyspareunia. POSTOPERATIVE DIAGNOSES:  1.  Endometrial polyp. 2.  Left ovarian endometrioma. 3.  Dyspareunia. SURGEON:  Johanna Mcclure MD    ASSISTANT:  Joe Kline MD    ANESTHESIA:  General.    ESTIMATED BLOOD LOSS:  30 mL. PATHOLOGY:  1.  Endometrial curettings. 2.  Left ovarian cyst wall. COMPLICATIONS:  None. SPECIMENS REMOVED:   1.endometrial curettings  2. Left ovarian cyst wall  IMPLANTS:  none    DESCRIPTION OF PROCEDURE:  The patient was taken to the operating room where general anesthesia was found to be adequate. She was then prepped and draped in normal sterile fashion in the dorsal lithotomy position. Exam under anesthesia revealed a small anteverted uterus with a large mass in the posterior fornix of the vagina. A Forman catheter was then placed into the bladder. A sterile weighted speculum was placed into the vagina and a Champion retractor into the anterior fornix. The cervix was severely displaced anteriorly which made it difficult to identify initially. The anterior lip of the cervix was then grasped with a single tooth tenaculum. The cervix was then easily dilated to 5 mm. The diagnostic hysteroscope was then advanced into the cavity and the cavity was distended with saline solution. Upon inspection, both tubal ostia were seen clearly. No distinct polypoid tissue was identified. At this time, the hysteroscope was removed and a sharp curettage was performed. The specimen was sent to pathology. A nuMVC manipulator was then attached to the anterior lip of the cervix and attention was then turned to the abdomen where 0.25% Marcaine was injected into the umbilicus. A 5 mm incision was then made with a scalpel.   The abdomen was tented and the Veress needle was inserted. Intra-abdominal placement was confirmed with the water-filled syringe. Gas was attached and the abdomen was insufflated with CO2 gas. Once an adequate pneumoperitoneum was obtained, the hysteroscope was advanced into the patient's abdomen under direct visualization of the laparoscope. Upon inspection, there was noted to be no injury to the adjacent organs. The patient was then placed in steep Trendelenburg and Marcaine was then injected into the right lower quadrant. A 5 mm incision was made and a trocar was advanced into the abdomen under direct visualization of the laparoscope. Similarly, a 12 mm incision was made in the left lower quadrant after Marcaine was injected. The 10 mm trocar was advanced into the abdomen under direct visualization of the laparoscope. Upon inspection, the appendix appeared to be normal.  The right tube and ovary appeared to be grossly normal.  The uterus was displaced anteriorly. The bladder appeared to be grossly normal.  The left ovary was enlarged with a cyst and was stuck and completely filled the cul-de-sac. Graspers were used to elevate the ovary and cyst out of the pelvis. At this time, an aspiration needle was advanced into the ovarian tissue and brown fluid indicating an endometrioma was extracted. Once the cyst was completely collapsed, the Harmonic scalpel was used to make a small incision into the ovarian tissue. The cyst wall was identified and was gently teased off of the ovarian tissue using traction. Once the cyst was completely removed, the tissue was handed off for pathology. Bipolar cautery was then used to obtain hemostasis. The pelvis was irrigated with copious amounts of fluid. Good hemostasis was noted. At this time, a Isai-Gregoria device was used to close the fascia of the port in the left lower quadrant. Fascia appeared to be adequately closed with a single 0 Vicryl suture.   The remainder of the trocars were removed and the abdomen was deflated. The incisions were closed with 4-0 Monocryl suture in a subcuticular fashion followed by Dermabond. All instruments were removed from the patient's vagina and the Forman catheter was removed from her bladder. The patient tolerated the procedure well. All sponge and needle counts were correct. The patient was taken to the recovery room in stable condition.       MD SAAD Wilcox / VAISHNAVI  D: 09/10/2018 12:31     T: 09/10/2018 14:45  JOB #: [id number]

## 2018-09-10 NOTE — ANESTHESIA PREPROCEDURE EVALUATION
Anesthetic History No history of anesthetic complications Pertinent negatives: No PONV Review of Systems / Medical History Patient summary reviewed, nursing notes reviewed and pertinent labs reviewed Pulmonary Pertinent negatives: No COPD, asthma and smoker Neuro/Psych Within defined limits Cardiovascular Hypertension: well controlled Pertinent negatives: No past MI and CAD 
 
  
GI/Hepatic/Renal 
Within defined limits Pertinent negatives: No GERD, liver disease and renal disease Endo/Other Within defined limits Pertinent negatives: No diabetes Other Findings Comments: etoh neg Physical Exam 
 
Airway Mallampati: II 
TM Distance: 4 - 6 cm Neck ROM: normal range of motion Mouth opening: Normal 
 
 Cardiovascular Dental 
 
 
  
Pulmonary Abdominal 
 
 
 
 Other Findings Anesthetic Plan ASA: 2 Anesthesia type: general 
 
 
 
 
Induction: Intravenous Anesthetic plan and risks discussed with: Patient

## 2018-09-10 NOTE — INTERVAL H&P NOTE
H&P Update: 
Aldair Hong was seen and examined. History and physical has been reviewed. The patient has been examined.  There have been no significant clinical changes since the completion of the originally dated History and Physical. 
 
Signed By: Jared Moura MD   
 September 10, 2018 12:24 PM

## 2018-09-10 NOTE — DISCHARGE INSTRUCTIONS
DISCHARGE SUMMARY from Nurse    PATIENT INSTRUCTIONS:    After general anesthesia or intravenous sedation, for 24 hours or while taking prescription Narcotics:  · Limit your activities  · Do not drive and operate hazardous machinery  · Do not make important personal or business decisions  · Do  not drink alcoholic beverages  · If you have not urinated within 8 hours after discharge, please contact your surgeon on call. Report the following to your surgeon:  · Excessive pain, swelling, redness or odor of or around the surgical area  · Temperature over 100.5  · Nausea and vomiting lasting longer than 4 hours or if unable to take medications  · Any signs of decreased circulation or nerve impairment to extremity: change in color, persistent  numbness, tingling, coldness or increase pain  · Any questions    What to do at Home:  LIGHT DIET TODAY ADVANCE AS TOLERATED  OK TO [address] 2 WEEKS  NO HEAVY LIFTING OR STRENUOUS ACTIVITY FOR SEVERAL DAYS  RETURN TO OFFICE IN 2 WEEKS CALL FOR APPT    If you experience any of the following symptoms: heavy bleeding, fevers, severe pain, please follow up with dr Nishi Freeman       *  Please give a list of your current medications to your Primary Care Provider. *  Please update this list whenever your medications are discontinued, doses are      changed, or new medications (including over-the-counter products) are added. *  Please carry medication information at all times in case of emergency situations. These are general instructions for a healthy lifestyle:    No smoking/ No tobacco products/ Avoid exposure to second hand smoke  Surgeon General's Warning:  Quitting smoking now greatly reduces serious risk to your health.     Obesity, smoking, and sedentary lifestyle greatly increases your risk for illness    A healthy diet, regular physical exercise & weight monitoring are important for maintaining a healthy lifestyle    You may be retaining fluid if you have a history of heart failure or if you experience any of the following symptoms:  Weight gain of 3 pounds or more overnight or 5 pounds in a week, increased swelling in our hands or feet or shortness of breath while lying flat in bed. Please call your doctor as soon as you notice any of these symptoms; do not wait until your next office visit. Recognize signs and symptoms of STROKE:    F-face looks uneven    A-arms unable to move or move unevenly    S-speech slurred or non-existent    T-time-call 911 as soon as signs and symptoms begin-DO NOT go       Back to bed or wait to see if you get better-TIME IS BRAIN. Warning Signs of HEART ATTACK     Call 911 if you have these symptoms:   Chest discomfort. Most heart attacks involve discomfort in the center of the chest that lasts more than a few minutes, or that goes away and comes back. It can feel like uncomfortable pressure, squeezing, fullness, or pain.  Discomfort in other areas of the upper body. Symptoms can include pain or discomfort in one or both arms, the back, neck, jaw, or stomach.  Shortness of breath with or without chest discomfort.  Other signs may include breaking out in a cold sweat, nausea, or lightheadedness. Don't wait more than five minutes to call 911 - MINUTES MATTER! Fast action can save your life. Calling 911 is almost always the fastest way to get lifesaving treatment. Emergency Medical Services staff can begin treatment when they arrive -- up to an hour sooner than if someone gets to the hospital by car. The discharge information has been reviewed with the patient and caregiver. The patient and caregiver verbalized understanding. Discharge medications reviewed with the patient and caregiver and appropriate educational materials and side effects teaching were provided.   ___________________________________________________________________________________________________________________________________     Laparoscopy: What to Expect at [facility]  After laparoscopic surgery, you are likely to have pain for the next several days. You may also feel like you have the flu. You may have a low fever and feel tired and sick to your stomach. This is common. You should feel better after 1 to 2 weeks. This care sheet gives you a general idea about how long it will take for you to recover. But each person recovers at a different pace. Follow the steps below to get better as quickly as possible. How can you care for yourself at home? Activity    · Rest when you feel tired. Getting enough sleep will help you recover.     · Try to walk each day. Start by walking a little more than you did the day before. Bit by bit, increase the amount you walk. Walking boosts blood flow and helps prevent pneumonia and constipation.     · Avoid strenuous activities, such as bicycle riding, jogging, weight lifting, or aerobic exercise, until your doctor says it is okay.     · Avoid lifting anything that would make you strain. This may include a child, heavy grocery bags and milk containers, a heavy briefcase or backpack, cat litter or dog food bags, or a vacuum .     · You may also have pain in your shoulder. The pain usually lasts about 1 or 2 days.     · You may drive when you are no longer taking pain medicine and can quickly move your foot from the gas pedal to the brake. You must also be able to sit comfortably for a long period of time, even if you do not plan to go far. You might get caught in traffic.     · You will probably need to take 2 weeks off from work. It depends on the type of work you do and how you feel.     · You may shower 24 to 48 hours after surgery, if your doctor okays it. Pat the cut (incision) dry. Do not take a bath for the first 2 weeks, or until your doctor tells you it is okay.    Diet    · If your stomach is upset, try bland, low-fat foods such as plain rice, broiled chicken, toast, and yogurt.     · Drink plenty of fluids (enough so that your urine is light yellow or clear like water) to prevent dehydration. Choose water and other caffeine-free clear liquids. If you have kidney, heart, or liver disease and have to limit fluids, talk with your doctor before you increase the amount of fluids you drink.     · You may notice that your bowel movements are not regular right after your surgery. This is common. Avoid constipation and straining with bowel movements. You may want to take a fiber supplement every day. If you have not had a bowel movement after a couple of days, ask your doctor about taking a mild laxative. Medicines    · Your doctor will tell you if and when you can restart your medicines. He or she will also give you instructions about taking any new medicines.     · If you take blood thinners, such as warfarin (Coumadin), clopidogrel (Plavix), or aspirin, be sure to talk to your doctor. He or she will tell you if and when to start taking those medicines again. Make sure that you understand exactly what your doctor wants you to do.     · Take pain medicines exactly as directed. ¨ If the doctor gave you a prescription medicine for pain, take it as prescribed. ¨ If you are not taking a prescription pain medicine, ask your doctor if you can take an over-the-counter medicine.     · If your doctor prescribed antibiotics, take them as directed. Do not stop taking them just because you feel better. You need to take the full course of antibiotics.     · If you think your pain medicine is making you sick to your stomach:  ¨ Take your medicine after meals (unless your doctor has told you not to). ¨ Ask your doctor for a different pain medicine. Incision care    · If you have strips of tape on the incision, leave the tape on for a week or until it falls off.     · Wash the area daily with warm, soapy water and pat it dry. Don't use hydrogen peroxide or alcohol, which can slow healing.  You may cover the area with a gauze bandage if it weeps or rubs against clothing. Change the bandage every day. Follow-up care is a key part of your treatment and safety. Be sure to make and go to all appointments, and call your doctor if you are having problems. It's also a good idea to know your test results and keep a list of the medicines you take. When should you call for help? Call 911 anytime you think you may need emergency care. For example, call if:    · You passed out (lost consciousness).     · You are short of breath.    Call your doctor now or seek immediate medical care if:    · You have pain that does not get better after you take pain medicine.     · You have loose stitches, or your incision comes open.     · Bright red blood has soaked through your bandage.     · You have signs of infection, such as:  ¨ Increased pain, swelling, warmth, or redness. ¨ Red streaks leading from the incision. ¨ Pus draining from the incision. ¨ A fever.     · You are sick to your stomach or cannot keep fluids down.     · You have signs of a blood clot in your leg (called a deep vein thrombosis), such as:  ¨ Pain in your calf, back of the knee, thigh, or groin. ¨ Redness and swelling in your leg or groin.     · You cannot pass stools or gas.    Watch closely for any changes in your health, and be sure to contact your doctor if you have any problems. Where can you learn more? Go to http://giovanni-sera.info/. Enter K966 in the search box to learn more about \"Laparoscopy: What to Expect at Home. \"  Current as of: May 12, 2017  Content Version: 11.7  © 6936-0484 [employer]. Care instructions adapted under license by [employer] (which disclaims liability or warranty for this information). If you have questions about a medical condition or this instruction, always ask your healthcare professional. [address] any warranty or liability for your use of this information.   Patient armband removed and shredded

## 2018-09-10 NOTE — PERIOP NOTES
TRANSFER - OUT REPORT: 
 
Verbal report given to Susan Man RN (name) on Josy Drafts  being transferred to phase 2 (unit) for routine progression of care Report consisted of patients Situation, Background, Assessment and  
Recommendations(SBAR). Information from the following report(s) SBAR, Kardex, OR Summary, Procedure Summary, Intake/Output, MAR and Recent Results was reviewed with the receiving nurse. Lines:  
Peripheral IV 09/10/18 Left Hand (Active) Site Assessment Clean, dry, & intact 9/10/2018 12:30 PM  
Phlebitis Assessment 0 9/10/2018 12:30 PM  
Infiltration Assessment 0 9/10/2018 12:30 PM  
Dressing Status Clean, dry, & intact 9/10/2018 12:30 PM  
Dressing Type Transparent 9/10/2018 12:30 PM  
Hub Color/Line Status Pink; Infusing 9/10/2018 12:30 PM  
  
 
Opportunity for questions and clarification was provided. Patient transported with: 
 [email or web address]

## (undated) DEVICE — 40580 - THE PINK PAD - ADVANCED TRENDELENBURG POSITIONING KIT: Brand: 40580 - THE PINK PAD - ADVANCED TRENDELENBURG POSITIONING KIT

## (undated) DEVICE — (D)SYR 10ML 1/5ML GRAD NSAF -- PKGING CHANGE USE ITEM 338027

## (undated) DEVICE — TROCAR LAP L100MM DIA5MM BLDELSS W/ STBL SL ENDOPATH XCEL

## (undated) DEVICE — GYN LAPAROSCOPY: Brand: MEDLINE INDUSTRIES, INC.

## (undated) DEVICE — STERILE POLYISOPRENE POWDER-FREE SURGICAL GLOVES: Brand: PROTEXIS

## (undated) DEVICE — TRAY CATH 16FR DRN BG LF -- CONVERT TO ITEM 363158

## (undated) DEVICE — DRAPE TWL SURG 16X26IN BLU ORB04] ALLCARE INC]

## (undated) DEVICE — NEEDLE HYPO 22GA L1.5IN BLK S STL HUB POLYPR SHLD REG BVL

## (undated) DEVICE — MEDI-VAC NON-CONDUCTIVE SUCTION TUBING: Brand: CARDINAL HEALTH

## (undated) DEVICE — TRAY PREP DRY W/ PREM GLV 2 APPL 6 SPNG 2 UNDPD 1 OVERWRAP

## (undated) DEVICE — SOLUTION LACTATED RINGERS INJECTION USP

## (undated) DEVICE — STERILE POLYISOPRENE POWDER-FREE SURGICAL GLOVES WITH EMOLLIENT COATING: Brand: PROTEXIS

## (undated) DEVICE — DISPOSABLE SUCTION/IRRIGATOR TUBE SET WITH TIP: Brand: AHTO

## (undated) DEVICE — REM POLYHESIVE ADULT PATIENT RETURN ELECTRODE: Brand: VALLEYLAB

## (undated) DEVICE — SUT MONOCRYL PLUS UD 4-0 --

## (undated) DEVICE — APPLICATOR BNDG 1MM ADH PREMIERPRO EXOFIN

## (undated) DEVICE — CYSTO/BLADDER IRRIGATION SET, REGULATING CLAMP

## (undated) DEVICE — DEVON™ KNEE AND BODY STRAP 60" X 3" (1.5 M X 7.6 CM): Brand: DEVON

## (undated) DEVICE — D&C HYSTEROSCOPY: Brand: MEDLINE INDUSTRIES, INC.

## (undated) DEVICE — SLEEVE TRCR L100MM DIA5MM UNIV STBL FOR BLDELSS DIL TIP

## (undated) DEVICE — GOWN,AURORA,NONRNF,XL,30/CS: Brand: MEDLINE

## (undated) DEVICE — KENDALL SCD EXPRESS SLEEVES, KNEE LENGTH, MEDIUM: Brand: KENDALL SCD